# Patient Record
Sex: FEMALE | Race: WHITE | Employment: FULL TIME | URBAN - METROPOLITAN AREA
[De-identification: names, ages, dates, MRNs, and addresses within clinical notes are randomized per-mention and may not be internally consistent; named-entity substitution may affect disease eponyms.]

---

## 2018-09-11 ENCOUNTER — APPOINTMENT (OUTPATIENT)
Dept: GENERAL RADIOLOGY | Age: 23
End: 2018-09-11
Payer: MEDICAID

## 2018-09-11 ENCOUNTER — HOSPITAL ENCOUNTER (EMERGENCY)
Age: 23
Discharge: HOME | End: 2018-09-11
Attending: EMERGENCY MEDICINE
Payer: MEDICAID

## 2018-09-11 VITALS
RESPIRATION RATE: 16 BRPM | DIASTOLIC BLOOD PRESSURE: 61 MMHG | TEMPERATURE: 97.6 F | WEIGHT: 180 LBS | BODY MASS INDEX: 30.73 KG/M2 | SYSTOLIC BLOOD PRESSURE: 126 MMHG | OXYGEN SATURATION: 100 % | HEART RATE: 74 BPM | HEIGHT: 64 IN

## 2018-09-11 DIAGNOSIS — M25.511 RIGHT SHOULDER PAIN, UNSPECIFIED CHRONICITY: Primary | ICD-10-CM

## 2018-09-11 PROCEDURE — 99283 EMERGENCY DEPT VISIT LOW MDM: CPT

## 2018-09-11 PROCEDURE — 73030 X-RAY EXAM OF SHOULDER: CPT

## 2018-09-11 ASSESSMENT — ENCOUNTER SYMPTOMS
COUGH: 0
SHORTNESS OF BREATH: 0
VOMITING: 0
NAUSEA: 0
SORE THROAT: 0
ABDOMINAL PAIN: 0

## 2018-09-11 ASSESSMENT — PAIN DESCRIPTION - FREQUENCY
FREQUENCY: INTERMITTENT
FREQUENCY: INTERMITTENT

## 2018-09-11 ASSESSMENT — PAIN SCALES - GENERAL: PAINLEVEL_OUTOF10: 8

## 2018-09-11 ASSESSMENT — PAIN DESCRIPTION - PAIN TYPE
TYPE: ACUTE PAIN
TYPE: ACUTE PAIN

## 2018-09-11 ASSESSMENT — PAIN DESCRIPTION - LOCATION
LOCATION: SHOULDER
LOCATION: SHOULDER

## 2018-09-11 ASSESSMENT — PAIN - FUNCTIONAL ASSESSMENT: PAIN_FUNCTIONAL_ASSESSMENT: ADVANCED DEMENTIA

## 2018-09-11 ASSESSMENT — PAIN DESCRIPTION - DESCRIPTORS: DESCRIPTORS: ACHING;DISCOMFORT

## 2018-09-11 ASSESSMENT — PAIN DESCRIPTION - ORIENTATION
ORIENTATION: RIGHT
ORIENTATION: RIGHT

## 2018-09-11 NOTE — ED NOTES
Ambulatory from department without difficulty. No distress noted. Pt instructed to follow up with family doctor or doctor referred by ED physician. Pt also given number to the Pt advocate. Pt reports no further needs or questions prior to discharge.      Jose Jacobo RN  09/11/18 3517
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
## 2018-09-11 NOTE — LETTER
[facility] Emergency Department  Madisyn Peoples [address] [address]  Phone: [phone]               September 11, 2018    Patient: Eulogio Perez   YOB: 1995   Date of Visit: 9/11/2018       To Whom It May Concern:    Eulogio Perez was seen and treated in our emergency department on 9/11/2018. She may return to work on 9/13/2018.       Sincerely,       Luís Steve MD           Signature:__________________________________

## 2019-06-28 ENCOUNTER — HOSPITAL ENCOUNTER (EMERGENCY)
Age: 24
Discharge: LEFT BEFORE DISCHARGE | End: 2019-06-28
Payer: MEDICAID

## 2019-06-28 PROCEDURE — 4500000002 HC ER NO CHARGE

## 2019-06-29 ENCOUNTER — HOSPITAL ENCOUNTER (EMERGENCY)
Age: 24
Discharge: HOME | End: 2019-06-29
Attending: EMERGENCY MEDICINE
Payer: MEDICAID

## 2019-06-29 VITALS
HEART RATE: 110 BPM | SYSTOLIC BLOOD PRESSURE: 132 MMHG | OXYGEN SATURATION: 100 % | DIASTOLIC BLOOD PRESSURE: 90 MMHG | BODY MASS INDEX: 23.92 KG/M2 | WEIGHT: 135 LBS | RESPIRATION RATE: 16 BRPM | TEMPERATURE: 98.8 F | HEIGHT: 63 IN

## 2019-06-29 DIAGNOSIS — H10.9 CONJUNCTIVITIS OF RIGHT EYE, UNSPECIFIED CONJUNCTIVITIS TYPE: Primary | ICD-10-CM

## 2019-06-29 PROCEDURE — 6370000000 HC RX 637 (ALT 250 FOR IP): Performed by: EMERGENCY MEDICINE

## 2019-06-29 PROCEDURE — 99282 EMERGENCY DEPT VISIT SF MDM: CPT

## 2019-06-29 RX ORDER — ERYTHROMYCIN 5 MG/G
OINTMENT OPHTHALMIC ONCE
Status: COMPLETED | OUTPATIENT
Start: 2019-06-29 | End: 2019-06-29

## 2019-06-29 RX ORDER — ERYTHROMYCIN 5 MG/G
OINTMENT OPHTHALMIC EVERY 6 HOURS
Qty: 3.5 G | Refills: 0 | Status: SHIPPED | OUTPATIENT
Start: 2019-06-29

## 2019-06-29 RX ADMIN — ERYTHROMYCIN: 5 OINTMENT OPHTHALMIC at 22:10
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
## 2021-07-10 ENCOUNTER — APPOINTMENT (OUTPATIENT)
Dept: GENERAL RADIOLOGY | Age: 26
End: 2021-07-10
Payer: COMMERCIAL

## 2021-07-10 ENCOUNTER — HOSPITAL ENCOUNTER (EMERGENCY)
Age: 26
Discharge: HOME | End: 2021-07-10
Attending: EMERGENCY MEDICINE
Payer: COMMERCIAL

## 2021-07-10 VITALS
SYSTOLIC BLOOD PRESSURE: 121 MMHG | HEART RATE: 98 BPM | RESPIRATION RATE: 14 BRPM | OXYGEN SATURATION: 100 % | TEMPERATURE: 99.3 F | DIASTOLIC BLOOD PRESSURE: 77 MMHG | WEIGHT: 150 LBS | HEIGHT: 64 IN | BODY MASS INDEX: 25.61 KG/M2

## 2021-07-10 DIAGNOSIS — S96.912A ANKLE STRAIN, LEFT, INITIAL ENCOUNTER: Primary | ICD-10-CM

## 2021-07-10 PROCEDURE — 73630 X-RAY EXAM OF FOOT: CPT

## 2021-07-10 PROCEDURE — 99283 EMERGENCY DEPT VISIT LOW MDM: CPT

## 2021-07-10 ASSESSMENT — ENCOUNTER SYMPTOMS: BACK PAIN: 0

## 2021-07-11 NOTE — ED PROVIDER NOTES
[facility]        Pt Name: Miles Brown  MRN: [id number]  [place] 1995  Date of evaluation: 7/10/2021  Provider: Iza Ivy MD  PCP: No primary care provider on file. ED Attending: Iza Ivy MD    CHIEF COMPLAINT       Chief Complaint   Patient presents with    Foot Injury     Patient arrived via private vehicle. States she was walking down a ramp wearing slides about 2 hours ago. States she rolled her left ankle and heard a pop. C/O pain. No noted edema or  deformity. Skin is intact. HISTORY OF PRESENT ILLNESS   (Location/Symptom, Timing/Onset, Context/Setting, Quality, Duration, Modifying Factors, Severity)  Note limiting factors. Miles Brown is a 27 y.o. female who states that she was walking down a ramp wearing flip-flops when she felt a popping and clicking sensation on the lateral aspect of her left foot. She has since developed swelling to the area as well as mild aching pain. It is worse with weightbearing and ambulation. Elevation and rest tends to make the pain better. No radiation. Patient was concerned that she had possibly broken something so came into the ER. History is obtained from the patient. REVIEW OF SYSTEMS    (2-9 systems for level 4, 10 or more for level 5)     Review of Systems   Constitutional: Negative for chills and fever. Musculoskeletal: Positive for joint swelling. Negative for back pain. Skin: Negative for rash and wound. Neurological: Negative for weakness and numbness. Positives and Pertinent negatives as per HPI. Except as noted above in the ROS, all other systems were reviewed and negative. PAST MEDICAL HISTORY     Past Medical History:   Diagnosis Date    Chlamydia     positive during pregnancy, treated and negative since         SURGICAL HISTORY   History reviewed. No pertinent surgical history.       [address]       Discharge Medication List as of 7/10/2021 11:08 PM      CONTINUE these medications which have NOT CHANGED    Details   erythromycin (ROMYCIN) 5 MG/GM ophthalmic ointment Place into the right eye every 6 hours, Right Eye, EVERY 6 HOURS Starting Sat 6/29/2019, Disp-3.5 g, R-0, Print               ALLERGIES     Patient has no known allergies. FAMILYHISTORY       Family History   Problem Relation Age of Onset    Cancer Maternal Aunt     Cancer Maternal Grandmother           SOCIAL HISTORY       Social History     Socioeconomic History    Marital status: Single     Spouse name: None    Number of children: None    Years of education: None    Highest education level: None   Occupational History    None   Tobacco Use    Smoking status: Never Smoker    Smokeless tobacco: Never Used   Vaping Use    Vaping Use: Never used   Substance and Sexual Activity    Alcohol use: No    Drug use: No    Sexual activity: Yes     Partners: Male   Other Topics Concern    None   Social History Narrative    None     Social Determinants of Health     Financial Resource Strain:     Difficulty of Paying Living Expenses:    Food Insecurity:     Worried About Running Out of Food in the Last Year:     Ran Out of Food in the Last Year:    Transportation Needs:     Lack of Transportation (Medical):      Lack of Transportation (Non-Medical):    Physical Activity:     Days of Exercise per Week:     Minutes of Exercise per Session:    Stress:     Feeling of Stress :    Social Connections:     Frequency of Communication with Friends and Family:     Frequency of Social Gatherings with Friends and Family:     Attends Jewish Services:     Active Member of Clubs or Organizations:     Attends Club or Organization Meetings:     Marital Status:    Intimate Partner Violence:     Fear of Current or Ex-Partner:     Emotionally Abused:     Physically Abused:     Sexually Abused:        SCREENINGS    Bia Coma Scale  Eye Opening: Spontaneous  Best Procedures    CRITICAL CARE TIME   N/A    CONSULTS:  None      EMERGENCY DEPARTMENT COURSE and DIFFERENTIAL DIAGNOSIS/MDM:   Vitals:    Vitals:    07/10/21 2150 07/10/21 2300   BP: 123/77 121/77   Pulse: 112 98   Resp: 16 14   Temp: 99.3 °F (37.4 °C)    TempSrc: Oral    SpO2: 100% 100%   Weight: 150 lb (68 kg)    Height: 5' 4\" (1.626 m)        Patient was given the following medications:  Medications - No data to display    Patient is an otherwise healthy 27year old who sustained an injury to her foot while walking down a ramp. Her exam is consistent with a tendon strain. Imaging does not show acute abnormality. Patient instructed to ice the area and use OTC medications for pain. Patient needs to follow up with a PCP for further care. She was referred for one. Patient is agreeable with the plan for discharge home. Differential diagnosis included but was not limited to: abrasion/laceration, contusion, fracture, sprain/strain, dislocation, septic arthritis. The patient understands the importance of follow up and reasons to return. FINAL IMPRESSION      1. Ankle strain, left, initial encounter          DISPOSITION/PLAN   DISPOSITION Decision To Discharge 07/10/2021 11:02:58 PM      PATIENT REFERRED TO:  [facility]) Pre-Services  [phone]        [facility] Emergency Department  [address] [place] [address],[address]  [phone]  Go to   If symptoms worsen    [address] [facility] Physician Referral Line  Call [phone] to get an appointment with a primary care provider. Call   Call [phone] for scheduling or appointments.       DISCHARGE MEDICATIONS:  Discharge Medication List as of 7/10/2021 11:08 PM          DISCONTINUED MEDICATIONS:  Discharge Medication List as of 7/10/2021 11:08 PM                 (Please note that portions of this note were completed with a voice recognition program.  Efforts were made to edit the dictations but occasionally words are mis-transcribed.)    Levar Sweeney MD(electronically signed)              Daniella Luciano MD  07/11/21 3129